# Patient Record
Sex: FEMALE | Race: BLACK OR AFRICAN AMERICAN | NOT HISPANIC OR LATINO | URBAN - METROPOLITAN AREA
[De-identification: names, ages, dates, MRNs, and addresses within clinical notes are randomized per-mention and may not be internally consistent; named-entity substitution may affect disease eponyms.]

---

## 2023-12-31 ENCOUNTER — EMERGENCY (EMERGENCY)
Facility: HOSPITAL | Age: 38
LOS: 0 days | Discharge: HOME | End: 2024-01-01
Attending: STUDENT IN AN ORGANIZED HEALTH CARE EDUCATION/TRAINING PROGRAM
Payer: COMMERCIAL

## 2023-12-31 VITALS
WEIGHT: 149.91 LBS | RESPIRATION RATE: 18 BRPM | HEIGHT: 60 IN | OXYGEN SATURATION: 100 % | SYSTOLIC BLOOD PRESSURE: 137 MMHG | DIASTOLIC BLOOD PRESSURE: 96 MMHG | TEMPERATURE: 98 F | HEART RATE: 100 BPM

## 2023-12-31 DIAGNOSIS — M54.50 LOW BACK PAIN, UNSPECIFIED: ICD-10-CM
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
## 2023-12-31 DIAGNOSIS — M54.2 CERVICALGIA: ICD-10-CM

## 2023-12-31 DIAGNOSIS — S29.012A STRAIN OF MUSCLE AND TENDON OF BACK WALL OF THORAX, INITIAL ENCOUNTER: ICD-10-CM
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
## 2023-12-31 PROCEDURE — 99284 EMERGENCY DEPT VISIT MOD MDM: CPT

## 2023-12-31 PROCEDURE — 99053 MED SERV 10PM-8AM 24 HR FAC: CPT
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
## 2024-01-01 PROCEDURE — 72125 CT NECK SPINE W/O DYE: CPT | Mod: 26,ME

## 2024-01-01 PROCEDURE — G1004: CPT

## 2024-01-01 RX ORDER — CYCLOBENZAPRINE HYDROCHLORIDE 10 MG/1
10 TABLET, FILM COATED ORAL ONCE
Refills: 0 | Status: COMPLETED | OUTPATIENT
Start: 2024-01-01 | End: 2024-01-01

## 2024-01-01 RX ORDER — LIDOCAINE 4 G/100G
1 CREAM TOPICAL ONCE
Refills: 0 | Status: COMPLETED | OUTPATIENT
Start: 2024-01-01 | End: 2024-01-01

## 2024-01-01 RX ORDER — LIDOCAINE 4 G/100G
1 CREAM TOPICAL
Qty: 10 | Refills: 0
Start: 2024-01-01 | End: 2024-01-05

## 2024-01-01 RX ORDER — CYCLOBENZAPRINE HYDROCHLORIDE 10 MG/1
1 TABLET, FILM COATED ORAL
Qty: 10 | Refills: 0
Start: 2024-01-01 | End: 2024-01-05

## 2024-01-01 RX ADMIN — Medication 500 MILLIGRAM(S): at 01:12

## 2024-01-01 RX ADMIN — LIDOCAINE 1 PATCH: 4 CREAM TOPICAL at 01:12

## 2024-01-01 RX ADMIN — CYCLOBENZAPRINE HYDROCHLORIDE 10 MILLIGRAM(S): 10 TABLET, FILM COATED ORAL at 01:11
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
## 2024-01-01 NOTE — ED PROVIDER NOTE - PATIENT PORTAL LINK FT
You can access the FollowMyHealth Patient Portal offered by [facility] by registering at the following website: [email or web address]. By joining [employer]’s FollowMyHealth portal, you will also be able to view your health information using other applications (apps) compatible with our system. You can access the FollowMyHealth Patient Portal offered by [facility] by registering at the following website: [email or web address]. By joining [employer]’s FollowMyHealth portal, you will also be able to view your health information using other applications (apps) compatible with our system.
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
## 2024-01-01 NOTE — ED PROVIDER NOTE - PHYSICAL EXAMINATION
General: well appearing sitting up in stretcher talking with family member  HEENT: NC/AT, MMM  Resp: no respiratory distress, tachypnea, or accessory muscle usage  Ext: no c/t/l spine ttp. pain along R trapezius, no deformities  Neuro: axoxo3, speaking full sentences, face symmetric, ambulatory